# Patient Record
Sex: MALE | Race: WHITE | NOT HISPANIC OR LATINO | URBAN - NONMETROPOLITAN AREA
[De-identification: names, ages, dates, MRNs, and addresses within clinical notes are randomized per-mention and may not be internally consistent; named-entity substitution may affect disease eponyms.]

---

## 2021-02-08 ENCOUNTER — OFFICE VISIT (OUTPATIENT)
Dept: URBAN - NONMETROPOLITAN AREA CLINIC 2 | Facility: CLINIC | Age: 21
End: 2021-02-08
Payer: COMMERCIAL

## 2021-02-08 ENCOUNTER — WEB ENCOUNTER (OUTPATIENT)
Dept: URBAN - NONMETROPOLITAN AREA CLINIC 2 | Facility: CLINIC | Age: 21
End: 2021-02-08

## 2021-02-08 DIAGNOSIS — R11.10 REGURGITATION OF FOOD: ICD-10-CM

## 2021-02-08 DIAGNOSIS — R14.0 ABDOMINAL BLOATING: ICD-10-CM

## 2021-02-08 DIAGNOSIS — Z72.0 TOBACCO USE: ICD-10-CM

## 2021-02-08 DIAGNOSIS — R19.7 LOOSE BOWEL MOVEMENTS: ICD-10-CM

## 2021-02-08 DIAGNOSIS — K62.5 BRIGHT RED BLOOD PER RECTUM: ICD-10-CM

## 2021-02-08 PROBLEM — 102622004 REGURGITATION OF FOOD: Status: ACTIVE | Noted: 2021-02-08

## 2021-02-08 PROBLEM — 405729008 BRIGHT RED BLOOD PER RECTUM: Status: ACTIVE | Noted: 2021-02-08

## 2021-02-08 PROBLEM — 110483000 TOBACCO USE: Status: ACTIVE | Noted: 2021-02-08

## 2021-02-08 PROBLEM — 116289008 ABDOMINAL BLOATING: Status: ACTIVE | Noted: 2021-02-08

## 2021-02-08 PROBLEM — 62315008 DIARRHEA: Status: ACTIVE | Noted: 2021-02-08

## 2021-02-08 PROCEDURE — 99204 OFFICE O/P NEW MOD 45 MIN: CPT | Performed by: INTERNAL MEDICINE

## 2021-02-08 NOTE — HPI-TODAY'S VISIT:
20 year old with past medical history of tobacco use, presenting for evaluation of regurgitation of food, loose bowel movements, bright red blood per rectum, abdominal bloating, and straining to have a bowel movement.  Mr. Chong has a history of straining to have a bowel movement throughout his life. He noted recently having liquid bowel movements that he has to strain to produce. He also recently noticed bright red blood in the toilet bowel after having a bowel movement. He does experience abdominal bloating which is relieved with passage of a bowel movement. He has had intermittent episodes of sensation of regurgitation of food.  He does experience loose bowel movements which are exacerbated by the consumption of food. He has attempted to identify a particular food leading to his loose bowel movements but has been unable to identify a particular trigger.   Denies family history of inflammatory bowel disease, colon polyps, or colon cancer.   He currently works as a  at [employer].

## 2021-02-10 LAB
A/G RATIO: 1.7
ALBUMIN: 4.6
ALKALINE PHOSPHATASE: 71
ALT (SGPT): 29
AST (SGOT): 19
BASO (ABSOLUTE): 0
BASOS: 1
BILIRUBIN, TOTAL: 0.3
BUN/CREATININE RATIO: 13
BUN: 14
C-REACTIVE PROTEIN, QUANT: <1
CALCIUM: 9.5
CARBON DIOXIDE, TOTAL: 22
CHLORIDE: 105
CREATININE: 1.08
EGFR IF AFRICN AM: 113
EGFR IF NONAFRICN AM: 98
ENDOMYSIAL ANTIBODY IGA: NEGATIVE
EOS (ABSOLUTE): 0.2
EOS: 4
GLOBULIN, TOTAL: 2.7
GLUCOSE: 89
HEMATOCRIT: 50.1
HEMATOLOGY COMMENTS:: (no result)
HEMOGLOBIN: 16.6
IMMATURE CELLS: (no result)
IMMATURE GRANS (ABS): 0
IMMATURE GRANULOCYTES: 0
IMMUNOGLOBULIN A, QN, SERUM: 120
LYMPHS (ABSOLUTE): 2.2
LYMPHS: 37
MCH: 28.4
MCHC: 33.1
MCV: 86
MONOCYTES(ABSOLUTE): 0.3
MONOCYTES: 6
NEUTROPHILS (ABSOLUTE): 3.1
NEUTROPHILS: 52
NRBC: (no result)
PLATELETS: 262
POTASSIUM: 4.4
PROTEIN, TOTAL: 7.3
RBC: 5.85
RDW: 11.8
SODIUM: 143
T-TRANSGLUTAMINASE (TTG) IGA: <2
TSH: 1.71
WBC: 5.9

## 2021-02-18 ENCOUNTER — TELEPHONE ENCOUNTER (OUTPATIENT)
Dept: URBAN - NONMETROPOLITAN AREA CLINIC 2 | Facility: CLINIC | Age: 21
End: 2021-02-18

## 2021-02-18 ENCOUNTER — OFFICE VISIT (OUTPATIENT)
Dept: URBAN - NONMETROPOLITAN AREA SURGERY CENTER 1 | Facility: SURGERY CENTER | Age: 21
End: 2021-02-18
Payer: COMMERCIAL

## 2021-02-18 DIAGNOSIS — K92.1 BLACK STOOL: ICD-10-CM

## 2021-02-18 DIAGNOSIS — R19.7 ACUTE DIARRHEA: ICD-10-CM

## 2021-02-18 DIAGNOSIS — K63.5 BENIGN COLON POLYP: ICD-10-CM

## 2021-02-18 DIAGNOSIS — K21.00 ALKALINE REFLUX ESOPHAGITIS: ICD-10-CM

## 2021-02-18 DIAGNOSIS — K31.89 ACQUIRED DEFORMITY OF DUODENUM: ICD-10-CM

## 2021-02-18 DIAGNOSIS — R10.13 ABDOMINAL DISCOMFORT, EPIGASTRIC: ICD-10-CM

## 2021-02-18 PROCEDURE — 45380 COLONOSCOPY AND BIOPSY: CPT | Performed by: INTERNAL MEDICINE

## 2021-02-18 PROCEDURE — G8907 PT DOC NO EVENTS ON DISCHARG: HCPCS | Performed by: INTERNAL MEDICINE

## 2021-02-18 PROCEDURE — 43239 EGD BIOPSY SINGLE/MULTIPLE: CPT | Performed by: INTERNAL MEDICINE

## 2021-03-17 ENCOUNTER — OFFICE VISIT (OUTPATIENT)
Dept: URBAN - NONMETROPOLITAN AREA CLINIC 13 | Facility: CLINIC | Age: 21
End: 2021-03-17

## 2021-03-17 NOTE — HPI-TODAY'S VISIT:
20 year old with past medical history of tobacco use, presenting for evaluation of regurgitation of food, loose bowel movements, bright red blood per rectum, abdominal bloating, and straining to have a bowel movement.   Mr. Chong has a history of straining to have a bowel movement throughout his life. He noted recently having liquid bowel movements that he has to strain to produce. He also recently noticed bright red blood in the toilet bowel after having a bowel movement. He does experience abdominal bloating which is relieved with passage of a bowel movement. He has had intermittent episodes of sensation of regurgitation of food.  He does experience loose bowel movements which are exacerbated by the consumption of food. He has attempted to identify a particular food leading to his loose bowel movements but has been unable to identify a particular trigger.   Denies family history of inflammatory bowel disease, colon polyps, or colon cancer.   He currently works as a  at [employer].  2/8/2021: CBC, chemistry panel, LFTs normal. Celiac serologies negative. TSH normal. CRP normal.  2/18/2021: EGD Findings: The nasopharynx and oropharynx showed mucosal fullness. The examined esophagus was normal. Biopsies obtained of the proximal and distal esophagus. LA Grade A esophagitis. The Z-line was regular and was found 40 cm from the incisors.  Erythematous mucosa without bleeding was found in the gastric antrum. Biopsied.  The cardia and gastric fundus were normal on retroflexion. The examined duodenum was normal. Biopsied.  2/18/2021: Pathology from EGD 2/8/2021: CBC, chemistry panel, LFTs normal. Celiac serologies negative. TSH normal. CRP normal.  2/18/2021: EGD Findings: The nasopharynx and oropharynx showed mucosal fullness. The examined esophagus was normal. Biopsies obtained of the proximal and distal esophagus. LA Grade A esophagitis. The Z-line was regular and was found 40 cm from the incisors.  Erythematous mucosa without bleeding was found in the gastric antrum. Biopsied.  The cardia and gastric fundus were normal on retroflexion. The examined duodenum was normal. Biopsied.  2/18/2021: Pathology from EGD Duodenum, Biopsy: No significant abnormality. Stomach, Body, Biopsy: Chemical/reactive gastropathy associated with focal intestinal metaplasia.  Esophagus, Distal, Biopsy: Squamous mucosa with reflux-type changes, no columnar mucosa identified. Esophagus, Proximal, Biopsy: Squamous mucosa with reflux-type changes, no columnar mucosa identified.  2/18/2021: Colonoscopy  Findings: The perianal and digital rectal examinations were normal. The terminal ileum appeared normal. Biopsied.  Retroflexion in the right colon was performed and was normal.  Normal mucosa was found in the entire colon. Biopsied.  A 3 mm polyp was found in the sigmoid colon. Polypectomy performed. The retroflexed view of the distal rectum and anal verge was normal.   2/18/2021: Pathology from Colonoscopy  Terminal Ileum, Biopsy: No significant abnormality. Colon, Random, Biopsy: No significant abnormality.  Colon, Sigmoid, Polypectomy: Hyperplastic polyp.   Plan: -ENT evaluation -PPI for reflux. -Repeat EGD for mapping of stomach.   -Do Helicobacter pylori testing.

## 2021-03-24 ENCOUNTER — OFFICE VISIT (OUTPATIENT)
Dept: URBAN - NONMETROPOLITAN AREA CLINIC 13 | Facility: CLINIC | Age: 21
End: 2021-03-24

## 2021-03-24 NOTE — HPI-TODAY'S VISIT:
20 year old with past medical history of tobacco use, presenting for evaluation of regurgitation of food, loose bowel movements, bright red blood per rectum, abdominal bloating, and straining to have a bowel movement.   Mr. Chong has a history of straining to have a bowel movement throughout his life. He noted recently having liquid bowel movements that he has to strain to produce. He also recently noticed bright red blood in the toilet bowel after having a bowel movement. He does experience abdominal bloating which is relieved with passage of a bowel movement. He has had intermittent episodes of sensation of regurgitation of food.  He does experience loose bowel movements which are exacerbated by the consumption of food. He has attempted to identify a particular food leading to his loose bowel movements but has been unable to identify a particular trigger.   Denies family history of inflammatory bowel disease, colon polyps, or colon cancer.   He currently works as a  at [employer].  2/8/2021: CBC, chemistry panel, LFTs normal. Celiac serologies negative. TSH normal. CRP normal.  2/18/2021: EGD Findings: The nasopharynx and oropharynx showed mucosal fullness. The examined esophagus was normal. Biopsies obtained of the proximal and distal esophagus. LA Grade A esophagitis. The Z-line was regular and was found 40 cm from the incisors.  Erythematous mucosa without bleeding was found in the gastric antrum. Biopsied.  The cardia and gastric fundus were normal on retroflexion. The examined duodenum was normal. Biopsied.  2/18/2021: Pathology from EGD 2/8/2021: CBC, chemistry panel, LFTs normal. Celiac serologies negative. TSH normal. CRP normal.  2/18/2021: EGD Findings: The nasopharynx and oropharynx showed mucosal fullness. The examined esophagus was normal. Biopsies obtained of the proximal and distal esophagus. LA Grade A esophagitis. The Z-line was regular and was found 40 cm from the incisors.  Erythematous mucosa without bleeding was found in the gastric antrum. Biopsied.  The cardia and gastric fundus were normal on retroflexion. The examined duodenum was normal. Biopsied.  2/18/2021: Pathology from EGD Duodenum, Biopsy: No significant abnormality. Stomach, Body, Biopsy: Chemical/reactive gastropathy associated with focal intestinal metaplasia.  Esophagus, Distal, Biopsy: Squamous mucosa with reflux-type changes, no columnar mucosa identified. Esophagus, Proximal, Biopsy: Squamous mucosa with reflux-type changes, no columnar mucosa identified.  2/18/2021: Colonoscopy  Findings: The perianal and digital rectal examinations were normal. The terminal ileum appeared normal. Biopsied.  Retroflexion in the right colon was performed and was normal.  Normal mucosa was found in the entire colon. Biopsied.  A 3 mm polyp was found in the sigmoid colon. Polypectomy performed. The retroflexed view of the distal rectum and anal verge was normal.   2/18/2021: Pathology from Colonoscopy  Terminal Ileum, Biopsy: No significant abnormality. Colon, Random, Biopsy: No significant abnormality.  Colon, Sigmoid, Polypectomy: Hyperplastic polyp.   Plan: -ENT evaluation -PPI for reflux. -Repeat EGD for mapping of stomach.   -Do Helicobacter pylori testing.

## 2022-05-09 ENCOUNTER — OFFICE VISIT (OUTPATIENT)
Dept: URBAN - NONMETROPOLITAN AREA CLINIC 2 | Facility: CLINIC | Age: 22
End: 2022-05-09

## 2022-05-09 ENCOUNTER — DASHBOARD ENCOUNTERS (OUTPATIENT)
Age: 22
End: 2022-05-09

## 2022-05-09 NOTE — HPI-TODAY'S VISIT:
2/8/2021: Initial Gastroenterology Clinic Visit  20 year old with past medical history of tobacco use, presenting for evaluation of regurgitation of food, loose bowel movements, bright red blood per rectum, abdominal bloating, and straining to have a bowel movement.   Mr. Chong has a history of straining to have a bowel movement throughout his life. He noted recently having liquid bowel movements that he has to strain to produce. He also recently noticed bright red blood in the toilet bowel after having a bowel movement. He does experience abdominal bloating which is relieved with passage of a bowel movement. He has had intermittent episodes of sensation of regurgitation of food.  He does experience loose bowel movements which are exacerbated by the consumption of food. He has attempted to identify a particular food leading to his loose bowel movements but has been unable to identify a particular trigger.   Denies family history of inflammatory bowel disease, colon polyps, or colon cancer.   He currently works as a  at [employer].  2/8/2021: CBC, chemistry panel, LFTs normal. Celiac serologies negative. TSH normal. CRP normal.  2/18/2021: EGD Findings: The nasopharynx and oropharynx showed mucosal fullness. The examined esophagus was normal. Biopsies obtained of the proximal and distal esophagus. LA Grade A esophagitis. The Z-line was regular and was found 40 cm from the incisors.  Erythematous mucosa without bleeding was found in the gastric antrum. Biopsied.  The cardia and gastric fundus were normal on retroflexion. The examined duodenum was normal. Biopsied.  2/18/2021: Pathology from EGD Duodenum, Biopsy: No significant abnormality. Stomach, Body, Biopsy: Chemical/reactive gastropathy associated with focal intestinal metaplasia.  Esophagus, Distal, Biopsy: Squamous mucosa with reflux-type changes, no columnar mucosa identified. Esophagus, Proximal, Biopsy: Squamous mucosa with reflux-type changes, no columnar mucosa identified.  2/18/2021: Colonoscopy  Findings: The perianal and digital rectal examinations were normal. The terminal ileum appeared normal. Biopsied.  Retroflexion in the right colon was performed and was normal.  Normal mucosa was found in the entire colon. Biopsied.  A 3 mm polyp was found in the sigmoid colon. Polypectomy performed. The retroflexed view of the distal rectum and anal verge was normal.   2/18/2021: Pathology from Colonoscopy  Terminal Ileum, Biopsy: No significant abnormality. Colon, Random, Biopsy: No significant abnormality.  Colon, Sigmoid, Polypectomy: Hyperplastic polyp.   Plan: -ENT evaluation -PPI for reflux. -Repeat EGD for mapping of stomach.   -Do Helicobacter pylori testing. -Recommend treatment of constipation. -Discuss repeat colonoscopy. -CT Abdomen adn Pelvis normal 3/2022.

## 2022-08-15 ENCOUNTER — OFFICE VISIT (OUTPATIENT)
Dept: URBAN - NONMETROPOLITAN AREA CLINIC 2 | Facility: CLINIC | Age: 22
End: 2022-08-15